# Patient Record
Sex: FEMALE | Race: WHITE | Employment: FULL TIME | URBAN - METROPOLITAN AREA
[De-identification: names, ages, dates, MRNs, and addresses within clinical notes are randomized per-mention and may not be internally consistent; named-entity substitution may affect disease eponyms.]

---

## 2019-01-25 ENCOUNTER — HOSPITAL ENCOUNTER (EMERGENCY)
Age: 24
Discharge: HOME | End: 2019-01-25
Attending: EMERGENCY MEDICINE
Payer: COMMERCIAL

## 2019-01-25 VITALS
DIASTOLIC BLOOD PRESSURE: 86 MMHG | HEART RATE: 114 BPM | RESPIRATION RATE: 14 BRPM | BODY MASS INDEX: 45.8 KG/M2 | WEIGHT: 285 LBS | TEMPERATURE: 98.2 F | OXYGEN SATURATION: 100 % | HEIGHT: 66 IN | SYSTOLIC BLOOD PRESSURE: 144 MMHG

## 2019-01-25 DIAGNOSIS — R03.0 ELEVATED BLOOD PRESSURE READING: ICD-10-CM

## 2019-01-25 DIAGNOSIS — B37.31 VAGINAL YEAST INFECTION: Primary | ICD-10-CM

## 2019-01-25 LAB
BACTERIA WET PREP: NORMAL
BILIRUBIN URINE: NEGATIVE
BLOOD, URINE: NEGATIVE
CLARITY: CLEAR
CLUE CELLS: NORMAL
COLOR: YELLOW
EPITHELIAL CELLS WET PREP: NORMAL
GLUCOSE URINE: NEGATIVE MG/DL
HCG(URINE) PREGNANCY TEST: NEGATIVE
KETONES, URINE: NEGATIVE MG/DL
LEUKOCYTE ESTERASE, URINE: NEGATIVE
MICROSCOPIC EXAMINATION: NORMAL
NITRITE, URINE: NEGATIVE
PH UA: 6
PROTEIN UA: NEGATIVE MG/DL
RBC WET PREP: NORMAL
SOURCE WET PREP: NORMAL
SPECIFIC GRAVITY UA: >=1.03
TRICHOMONAS PREP: NORMAL
URINE REFLEX TO CULTURE: NORMAL
URINE TYPE: NORMAL
UROBILINOGEN, URINE: 1 E.U./DL
WBC WET PREP: NORMAL
YEAST WET PREP: NORMAL

## 2019-01-25 PROCEDURE — 99283 EMERGENCY DEPT VISIT LOW MDM: CPT

## 2019-01-25 PROCEDURE — 87210 SMEAR WET MOUNT SALINE/INK: CPT

## 2019-01-25 PROCEDURE — 81003 URINALYSIS AUTO W/O SCOPE: CPT

## 2019-01-25 PROCEDURE — 84703 CHORIONIC GONADOTROPIN ASSAY: CPT

## 2019-01-25 PROCEDURE — 6370000000 HC RX 637 (ALT 250 FOR IP): Performed by: EMERGENCY MEDICINE

## 2019-01-25 RX ORDER — FLUCONAZOLE 100 MG/1
150 TABLET ORAL ONCE
Status: COMPLETED | OUTPATIENT
Start: 2019-01-25 | End: 2019-01-25

## 2019-01-25 RX ORDER — FLUCONAZOLE 150 MG/1
150 TABLET ORAL ONCE
Qty: 1 TABLET | Refills: 0 | Status: SHIPPED | OUTPATIENT
Start: 2019-01-28 | End: 2019-01-28

## 2019-01-25 RX ADMIN — FLUCONAZOLE 150 MG: 100 TABLET ORAL at 01:42

## 2020-10-08 ENCOUNTER — APPOINTMENT (OUTPATIENT)
Dept: GENERAL RADIOLOGY | Age: 25
End: 2020-10-08
Payer: COMMERCIAL

## 2020-10-08 ENCOUNTER — HOSPITAL ENCOUNTER (EMERGENCY)
Age: 25
Discharge: HOME | End: 2020-10-08
Payer: COMMERCIAL

## 2020-10-08 VITALS
HEART RATE: 75 BPM | OXYGEN SATURATION: 100 % | DIASTOLIC BLOOD PRESSURE: 85 MMHG | TEMPERATURE: 97.9 F | SYSTOLIC BLOOD PRESSURE: 129 MMHG | RESPIRATION RATE: 14 BRPM

## 2020-10-08 LAB
BILIRUBIN URINE: NEGATIVE
BLOOD, URINE: NEGATIVE
CLARITY: CLEAR
COLOR: YELLOW
GLUCOSE URINE: NEGATIVE MG/DL
HCG(URINE) PREGNANCY TEST: NEGATIVE
KETONES, URINE: NEGATIVE MG/DL
LEUKOCYTE ESTERASE, URINE: NEGATIVE
MICROSCOPIC EXAMINATION: NORMAL
NITRITE, URINE: NEGATIVE
PH UA: 6 (ref 5–8)
PROTEIN UA: NEGATIVE MG/DL
SPECIFIC GRAVITY UA: 1.02 (ref 1–1.03)
URINE REFLEX TO CULTURE: NORMAL
URINE TYPE: NORMAL
UROBILINOGEN, URINE: 1 E.U./DL

## 2020-10-08 PROCEDURE — 6360000002 HC RX W HCPCS: Performed by: PHYSICIAN ASSISTANT

## 2020-10-08 PROCEDURE — 84703 CHORIONIC GONADOTROPIN ASSAY: CPT

## 2020-10-08 PROCEDURE — 72100 X-RAY EXAM L-S SPINE 2/3 VWS: CPT

## 2020-10-08 PROCEDURE — 99283 EMERGENCY DEPT VISIT LOW MDM: CPT

## 2020-10-08 PROCEDURE — 96372 THER/PROPH/DIAG INJ SC/IM: CPT

## 2020-10-08 PROCEDURE — 6370000000 HC RX 637 (ALT 250 FOR IP): Performed by: PHYSICIAN ASSISTANT

## 2020-10-08 PROCEDURE — 81003 URINALYSIS AUTO W/O SCOPE: CPT

## 2020-10-08 RX ORDER — NAPROXEN 500 MG/1
500 TABLET ORAL 2 TIMES DAILY WITH MEALS
Qty: 20 TABLET | Refills: 0 | Status: SHIPPED | OUTPATIENT
Start: 2020-10-08 | End: 2020-10-18

## 2020-10-08 RX ORDER — ORPHENADRINE CITRATE 30 MG/ML
60 INJECTION INTRAMUSCULAR; INTRAVENOUS ONCE
Status: COMPLETED | OUTPATIENT
Start: 2020-10-08 | End: 2020-10-08

## 2020-10-08 RX ORDER — CYCLOBENZAPRINE HCL 10 MG
10 TABLET ORAL 3 TIMES DAILY PRN
Qty: 21 TABLET | Refills: 0 | Status: SHIPPED | OUTPATIENT
Start: 2020-10-08 | End: 2020-10-18

## 2020-10-08 RX ORDER — HYDROCODONE BITARTRATE AND ACETAMINOPHEN 5; 325 MG/1; MG/1
1 TABLET ORAL ONCE
Status: COMPLETED | OUTPATIENT
Start: 2020-10-08 | End: 2020-10-08

## 2020-10-08 RX ORDER — KETOROLAC TROMETHAMINE 30 MG/ML
30 INJECTION, SOLUTION INTRAMUSCULAR; INTRAVENOUS ONCE
Status: COMPLETED | OUTPATIENT
Start: 2020-10-08 | End: 2020-10-08

## 2020-10-08 RX ORDER — LIDOCAINE 50 MG/G
1 PATCH TOPICAL EVERY 24 HOURS
Qty: 30 PATCH | Refills: 0 | Status: SHIPPED | OUTPATIENT
Start: 2020-10-08 | End: 2020-11-07

## 2020-10-08 RX ADMIN — HYDROCODONE BITARTRATE AND ACETAMINOPHEN 1 TABLET: 5; 325 TABLET ORAL at 18:49

## 2020-10-08 RX ADMIN — KETOROLAC TROMETHAMINE 30 MG: 30 INJECTION, SOLUTION INTRAMUSCULAR; INTRAVENOUS at 18:49

## 2020-10-08 RX ADMIN — ORPHENADRINE CITRATE 60 MG: 30 INJECTION INTRAMUSCULAR; INTRAVENOUS at 18:49

## 2020-10-08 ASSESSMENT — PAIN SCALES - GENERAL
PAINLEVEL_OUTOF10: 3
PAINLEVEL_OUTOF10: 8
PAINLEVEL_OUTOF10: 8

## 2020-10-08 ASSESSMENT — PAIN DESCRIPTION - LOCATION: LOCATION: BACK

## 2020-10-08 ASSESSMENT — PAIN DESCRIPTION - PAIN TYPE: TYPE: ACUTE PAIN

## 2020-10-08 NOTE — ED PROVIDER NOTES
[address] ED  EMERGENCY DEPARTMENT ENCOUNTER        Pt Name: John Paul Rhoades  MRN: [id number]  [place] 1995  Date of evaluation: 10/8/2020  Provider: Andrew Gonzalez PA-C  PCP: No primary care provider on file. Shared Visit or Autonomous Visit: LUCERO. I have evaluated this patient. My supervising physician was available for consultation. CHIEF COMPLAINT       Chief Complaint   Patient presents with    Back Pain       HISTORY OF PRESENT ILLNESS   (Location/Symptom, Timing/Onset, Context/Setting, Quality, Duration, Modifying Factors, Severity)  Note limiting factors. John Paul Rhoades is a 21 y.o. female presenting to the emergency department with complaint of lower back pain on the left side that has bothered her for at least the past 4 months. 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 No falls or direct trauma. Pain was coming and going and now is more constant and worsening over the past 1 month. She saw her family doctor 1 month ago and was prescribed prednisone taper and Robaxin states no relief feels she is worsening. Pain is worse with movement position change when she lays on her left side and is relieved when she changes positions. No radiation of pain into extremities. No bowel or bladder dysfunction. No saddle anesthesia. No numbness or weakness of extremities. No dysuria hematuria. No abdominal pain. No vomiting or fevers. States she gets pain quite often in her lower back that she figures is from her job but this pain is worse and constant now. No chest pain no shortness of breath. No cough or fevers. The history is provided by the patient. Back Pain   Location:  Lumbar spine  Quality:  Aching  Radiates to:  Does not radiate  Onset quality:  Gradual  Duration:  1 month  Progression:  Worsening  Chronicity:  New  Context: not falling    Worsened by:   Movement and palpation  Associated symptoms: no abdominal pain, no abdominal swelling, no bladder incontinence, no bowel incontinence, no chest pain, no dysuria, no fever, no leg pain, no numbness, no pelvic pain, no perianal numbness and no weakness            Nursing Notes were reviewed    REVIEW OF SYSTEMS    (2-9 systems for level 4, 10 or more for level 5)     Review of Systems   Constitutional: Negative for chills and fever. Respiratory: Negative for shortness of breath. Cardiovascular: Negative for chest pain. Gastrointestinal: Negative for abdominal pain, bowel incontinence and vomiting. Genitourinary: Negative for bladder incontinence, difficulty urinating, dysuria and pelvic pain. Musculoskeletal: Positive for back pain. Skin: Negative for rash. Neurological: Negative for weakness and numbness. All other systems reviewed and are negative. Positives and Pertinent negatives as per HPI. PAST MEDICAL HISTORY     Past Medical History:   Diagnosis Date    GDM (gestational diabetes mellitus) 5/11/2016    GDM (gestational diabetes mellitus) 5/11/2016         SURGICAL HISTORY     Past Surgical History:   Procedure Laterality Date    TONSILLECTOMY           CURRENTMEDICATIONS       Discharge Medication List as of 10/8/2020  7:37 PM            ALLERGIES     Patient has no known allergies.     FAMILYHISTORY       Family History   Problem Relation Age of Onset    Hypertension Mother     Asthma Mother     Diabetes Maternal Grandmother     Hypertension Maternal Grandmother     High Cholesterol Maternal Grandmother     Thyroid Disease Maternal Grandmother     Cancer Maternal Grandfather     Diabetes Paternal Grandmother     Cancer Paternal Grandfather           SOCIAL HISTORY       Social History     Socioeconomic History    Marital status:      Spouse name: None    Number of children: None    Years of education: None    Highest education level: None   Occupational History    None   Social Needs    Financial resource strain: None    Food insecurity     Worry: None     Inability: None    Transportation needs     Medical: None     Non-medical: None   Tobacco Use    Smoking status: Never Smoker    Smokeless tobacco: Never Used   Substance and Sexual Activity    Alcohol use: Yes     Alcohol/week: 0.0 standard drinks     Comment: OCCAS    Drug use: No     Types: Marijuana     Comment: occasionally    Sexual activity: Yes     Partners: Male   Lifestyle    Physical activity     Days per week: None     Minutes per session: None    Stress: None   Relationships    Social connections     Talks on phone: None     Gets together: None     Attends [employer] service: None     Active member of club or organization: None     Attends meetings of clubs or organizations: None     Relationship status: None    Intimate partner violence     Fear of current or ex partner: None     Emotionally abused: None     Physically abused: None     Forced sexual activity: None   Other Topics Concern    None   Social History Narrative    None       SCREENINGS    [place] Coma Scale  Eye Opening: Spontaneous  Best Verbal Response: Oriented  Best Motor Response: Obeys commands  [place] Coma Scale Score: 15        PHYSICAL EXAM    (up to 7 for level 4, 8 or more for level 5)     ED Triage Vitals [10/08/20 1818]   BP Temp Temp src Pulse Resp SpO2 Height Weight   (!) 155/90 97.9 °F (36.6 °C) -- 94 20 98 % -- --       Physical Exam  Vitals signs and nursing note reviewed. Constitutional:       Appearance: She is well-developed. She is not ill-appearing or toxic-appearing. HENT:      Head: Normocephalic and atraumatic. Mouth/Throat:      Mouth: Mucous membranes are moist.      Pharynx: Oropharynx is clear. No pharyngeal swelling or posterior oropharyngeal erythema. Eyes:      Conjunctiva/sclera: Conjunctivae normal.   Cardiovascular:      Rate and Rhythm: Normal rate and regular rhythm. Pulses: Normal pulses. Posterior tibial pulses are 2+ on the right side and 2+ on the left side. Heart sounds: Normal heart sounds. Pulmonary:      Effort: Pulmonary effort is normal. No respiratory distress. Breath sounds: Normal breath sounds. No stridor. No wheezing, rhonchi or rales. Abdominal:      General: Bowel sounds are normal.      Palpations: Abdomen is soft. Tenderness: There is no abdominal tenderness. There is no guarding or rebound. Musculoskeletal:      Lumbar back: She exhibits tenderness and pain. She exhibits normal pulse. Back:    Skin:     General: Skin is warm and dry. Neurological:      Mental Status: She is alert and oriented to person, place, and time. Sensory: Sensation is intact. Motor: Motor function is intact. No weakness or abnormal muscle tone. Gait: Gait is intact. Deep Tendon Reflexes:      Reflex Scores:       Patellar reflexes are 2+ on the right side and 2+ on the left side. Achilles reflexes are 2+ on the right side and 2+ on the left side. Comments: Intact sensation. Equal strength 5 out of 5 symmetric lower extremities. Patient flexes and extends legs against resistance. Good dorsi and plantarflexion of feet against resistance. Reflexes intact and symmetric patellar and Achilles 2+. Patient ambulates in the room with normal gait no foot drop.    Psychiatric:         Behavior: Behavior normal.         DIAGNOSTIC RESULTS   LABS:    Labs Reviewed   URINE RT REFLEX TO CULTURE    Narrative:     Performed at:  [facility] [address],  [place], [facility]   Phone [phone]   PREGNANCY, URINE    Narrative:     Performed at:  [facility]) - [facility] [address],  [place], [facility]   Phone [phone]     Results for orders placed or performed during the hospital encounter of 10/08/20   Urinalysis Reflex to Culture    Specimen: Urine, clean catch   Result Value Ref Range PROCEDURES   Unless otherwise noted below, none     Procedures    CRITICAL CARE TIME   N/A    CONSULTS:  None      EMERGENCY DEPARTMENT COURSE and DIFFERENTIAL DIAGNOSIS/MDM:   Vitals:    Vitals:    10/08/20 1818 10/08/20 1940   BP: (!) 155/90 129/85   Pulse: 94 75   Resp: 20 14   Temp: 97.9 °F (36.6 °C)    SpO2: 98% 100%       Patient was given thefollowing medications:  Medications   ketorolac (TORADOL) injection 30 mg (30 mg Intramuscular Given 10/8/20 1849)   orphenadrine (NORFLEX) injection 60 mg (60 mg Intramuscular Given 10/8/20 1849)   HYDROcodone-acetaminophen (NORCO) 5-325 MG per tablet 1 tablet (1 tablet Oral Given 10/8/20 1849)       7:26 PM EDT  Recheck patient. Much improved after medications given here. Discussed test results with her. X-ray negative for any fracture or misalignment. She has degenerative change at T12-L1. She is provided referrals for follow-up prescriptions given advise returning for any worsening she understands and agrees. I estimate there is LOW risk for ABDOMINAL AORTIC ANEURYSM, CAUDA EQUINA SYNDROME, EPIDURAL MASS LESION, OR CORD COMPRESSION, thus I consider the discharge disposition reasonable. FINAL IMPRESSION      1. Acute left-sided low back pain without sciatica    2.  Elevated blood pressure reading          DISPOSITION/PLAN   DISPOSITION Decision to Discharge    PATIENT REFERREDTO:  [facility]) Pre-Services  [phone]  Schedule an appointment as soon as possible for a visit       [facility] ED  [address]  [phone]    If symptoms worsen    Syed Lopez MD  [place] [id number] [facility] [address]  [phone]    Schedule an appointment as soon as possible for a visit   [place] [facility]      DISCHARGE MEDICATIONS:  Discharge Medication List as of 10/8/2020  7:37 PM      START taking these medications    Details   lidocaine (LIDODERM) 5 % Place 1 patch onto the skin every 24 hours 12 hours on, 12 hours off., Disp-30 patch,R-0Print      cyclobenzaprine (FLEXERIL) 10 MG tablet Take 1 tablet by mouth 3 times daily as needed for Muscle spasms, Disp-21 tablet,R-0Print      naproxen (NAPROSYN) 500 MG tablet Take 1 tablet by mouth 2 times daily (with meals) for 10 days, Disp-20 tablet,R-0Print             DISCONTINUED MEDICATIONS:  Discharge Medication List as of 10/8/2020  7:37 PM      STOP taking these medications       Phentermine HCl (ADIPEX-P PO) Comments:   Reason for Stopping:                      (Please note that portions ofthis note were completed with a voice recognition program.  Efforts were made to edit the dictations but occasionally words are mis-transcribed.)    Kanika Huff PA-C (electronically signed)            Poornima Franco PA-C  10/09/20 [id number]

## 2020-10-08 NOTE — LETTER
[facility]  [address] [address]  Phone: [phone]               October 8, 2020    Patient: Evi Maguire   YOB: 1995   Date of Visit: 10/8/2020       To Whom It May Concern:    Negro Presume was seen and treated in our emergency department on 10/8/2020. Please excuse from work 10/9/2020.     Sincerely,       Xin Dillon PA-C         Signature:__________________________________

## 2020-10-09 ASSESSMENT — ENCOUNTER SYMPTOMS
BOWEL INCONTINENCE: 0
ABDOMINAL PAIN: 0
BACK PAIN: 1
SHORTNESS OF BREATH: 0
ABDOMINAL SWELLING: 0
VOMITING: 0

## 2020-10-14 ENCOUNTER — OFFICE VISIT (OUTPATIENT)
Dept: ORTHOPEDIC SURGERY | Age: 25
End: 2020-10-14
Payer: COMMERCIAL

## 2020-10-14 VITALS — WEIGHT: 293 LBS | BODY MASS INDEX: 47.09 KG/M2 | HEIGHT: 66 IN

## 2020-10-14 PROCEDURE — 99203 OFFICE O/P NEW LOW 30 MIN: CPT | Performed by: FAMILY MEDICINE

## 2020-10-14 PROCEDURE — 1036F TOBACCO NON-USER: CPT | Performed by: FAMILY MEDICINE

## 2020-10-14 PROCEDURE — G8484 FLU IMMUNIZE NO ADMIN: HCPCS | Performed by: FAMILY MEDICINE

## 2020-10-14 PROCEDURE — L0625 LO FLEX L1-BELOW L5 PRE OTS: HCPCS | Performed by: FAMILY MEDICINE

## 2020-10-14 PROCEDURE — G8417 CALC BMI ABV UP PARAM F/U: HCPCS | Performed by: FAMILY MEDICINE

## 2020-10-14 PROCEDURE — G8427 DOCREV CUR MEDS BY ELIG CLIN: HCPCS | Performed by: FAMILY MEDICINE

## 2020-10-14 RX ORDER — DICLOFENAC SODIUM 75 MG/1
75 TABLET, DELAYED RELEASE ORAL 2 TIMES DAILY
Qty: 60 TABLET | Refills: 3 | Status: SHIPPED | OUTPATIENT
Start: 2020-10-14

## 2020-10-14 NOTE — PROGRESS NOTES
tenderness, deformity or injury. Range of motion is unremarkable. There is no gross instability. There are no rashes, ulcerations or lesions. Strength and tone are normal.      Diagnostic Test Findings:    AP and lateral lumbar spine films obtained at [facility] 10/8/2020 as listed above  Narrative    EXAMINATION:    THREE XRAY VIEWS OF THE LUMBAR SPINE         10/8/2020 6:48 pm         COMPARISON:    None.         HISTORY:    ORDERING SYSTEM PROVIDED HISTORY: pain    TECHNOLOGIST PROVIDED HISTORY:    Reason for exam:->pain    Reason for Exam: back pain    Acuity: Unknown    Type of Exam: Unknown         FINDINGS:    Bone density is preserved with no acute fracture.  Vertebral alignment is    maintained.  Disc space height is normal.  Minimal osteoarthritic spurring at    T12-L1 with no other significant arthritic changes.  The facet joints are    unremarkable.  The SI joints are intact.              Impression    No acute osseous abnormality of the lumbar spine.                  Assessment: #1.  3 to 4-month status post unrehabilitated thoracolumbar strain/myofascial pain with lower back pain. Impression:  Encounter Diagnoses   Name Primary?  Thoracic myofascial strain, initial encounter Yes    Strain of lumbar region, initial encounter     Acute left-sided low back pain without sciatica        Office Procedures:  Orders Placed This Encounter   Procedures    Ambulatory referral to Physical Therapy     Referral Priority:   Routine     Referral Type:   Eval and Treat     Referral Reason:   Specialty Services Required     Requested Specialty:   Physical Therapy     Number of Visits Requested:   1    Bird and Syed Extensor Lumbosacral Support Brace (Warm and Form)     Patient was prescribed a Bird and Syed Extensor Lumbosacral Support Brace with a pocket.   This orthosis is required for the following reasons:    Reduce pain by restricting mobility of the trunk  Facilitate healing following an injury to the spine or related soft tissues  Support weak spinal muscles    The patient was educated and fit by a healthcare professional with expert knowledge and specialization in brace application while under the direct supervision of the treating physician. Verbal and written instructions for the use of and application of this item were provided. They were instructed to contact the office immediately should the brace result in increased pain, decreased sensation, increased swelling or worsening of the condition. Treatment Plan:  Treatment options were discussed with Evi Maguire. We did review her plain films and exam findings. We did place her in a warm-and-form type belt and start her in aggressive physical therapy for her suspected left thoracolumbar myofascial pain. We also started her on diclofenac 75 mg 1 pill twice daily and may continue with the muscle relaxer given to her by the emergency room potentially at night. I think she can work in her belt. We will see her back in about 4 weeks and consider imaging if she is failing to improve. She will contact us in the interim with questions or concerns. This dictation was performed with a verbal recognition program (DRAGON) and it was checked for errors. It is possible that there are still dictated errors within this office note. If so, please bring any errors to my attention for an addendum. All efforts were made to ensure that this office note is accurate.

## 2020-10-29 ENCOUNTER — HOSPITAL ENCOUNTER (OUTPATIENT)
Dept: PHYSICAL THERAPY | Age: 25
Setting detail: THERAPIES SERIES
Discharge: HOME | End: 2020-10-29
Payer: COMMERCIAL

## 2020-10-29 PROCEDURE — 97161 PT EVAL LOW COMPLEX 20 MIN: CPT

## 2020-10-29 PROCEDURE — 97110 THERAPEUTIC EXERCISES: CPT

## 2020-10-29 NOTE — PLAN OF CARE
[facility]  [address] 44, [place] [address], [address] [facility] [address]  Phone: [phone]   Fax:     [phone]     Physical Therapy Certification    Dear Referring Practitioner: Dr. Erendira Pastor,    We had the pleasure of evaluating the following patient for physical therapy services at [address] [place] [address]. A summary of our findings can be found in the initial assessment below. This includes our plan of care. If you have any questions or concerns regarding these findings, please do not hesitate to contact me at the office phone number checked above. Thank you for the referral.       Physician Signature:_______________________________Date:__________________  By signing above (or electronic signature), therapists plan is approved by physician      Patient: Aaron Charlton   : 1995   MRN: [id number]  Referring Physician: Referring Practitioner: Dr. Erendira Pastor      Evaluation Date: 10/29/2020      Medical Diagnosis Information:  Diagnosis: Thoracic myofascial strain S29.019A  Lumbar strain S39.012A Acute left sided LBP M54.5   Treatment Diagnosis: Acute left sided LBP M54.5                                         Insurance information: PT Insurance Information: [employer]     Precautions/ Contra-indications: none noted      C-SSRS Triggered by Intake questionnaire (Past 2 wk assessment):   [x] No, Questionnaire did not trigger screening.   [] Yes, Patient intake triggered further evaluation      [] C-SSRS Screening completed  [] PCP notified via Plan of Care  [] Emergency services notified     Latex Allergy:  [x]NO      []YES  Preferred Language for Healthcare:   [x]English       []other:    SUBJECTIVE: Patient stated complaint: Reports over a year insidious onset of LBP. 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 Denies specific incident or injury.  Transferring patientsincreases symptoms. Had XR, neg for acute injury. 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 OBJECTIVE:     ROM  Comments   Lumbar Flex To fee    Lumbar Ext       ROM LEFT RIGHT Comments   Lumbar Side Bend To knee To knee p!     Lumbar Rotation 100% 100%    Hip Flexion      Hip Abd      Hip ER      Hip IR      Hip Extension      Knee Ext      Knee Flex      Hamstring Flex      Piriformis                    Strength LEFT RIGHT Comments   Multifidus      Transverse Ab      Hip Flexors      Hip Abductors      Hip Extensors                     Myotomes Normal Abnormal Comments   Hip flexion (L1-L2) X     Knee extension (L2-L4) X     Dorsiflexion (L4-L5) X     Great Toe Ext (L5) X     Ankle Eversion (S1-S2) X     Ankle PF(S1-S2) X       Dermatomes Normal Abnormal Comments   inguinal area (L1)  X     anterior mid-thigh (L2) X     distal ant thigh/med knee (L3) X     medial lower leg and foot (L4) X     lateral lower leg and foot (L5) X     posterior calf (S1) X     medial calcaneus (S2) X       Neural dynamic tension testing Normal Abnormal Comments   Slump Test  - Degree of knee flexion:       SLR  X     0-30      30-70      Femoral nerve (L2-4)        Reflexes Normal Abnormal Comments   S1-2 Seated achilles      S1-2 Prone knee bend      L3-4 Patellar tendon      C5-6 Biceps      C6 Brachioradialis      C7-8 Triceps      Clonus X     Babinski      Luna's X       Joint mobility:     []Normal    []Hypo   []Hyper    Palpation: Lumbar CPA's mod-max TTP    Functional Mobility/Transfers: I with transfers    Posture: WFL    Gait: (include devices/WB status) I no AD    Bandages/Dressings/Incisions: NA     Orthopedic Special Tests:    Normal Abnormal N/A Comments   Toe walk   X      Heel Walk X      Fwd Bend-aberrant or innominate mvmt) X      Trendelenburg X      Kemps/Quadrant       Ethelk       NEREIDA/Tucker X      Hip scour       SLR X      Crossed SLR       Supine to sit       Hip thrust       SI distraction/compression       PA/Spring       Prone Instability test       Prone knee bend       Sacral Spring/thrust                  [x] Patient history, allergies, meds reviewed. Medical chart reviewed. See intake form. Review Of Systems (ROS):  [x]Performed Review of systems (Integumentary, CardioPulmonary, Neurological) by intake and observation. Intake form has been scanned into medical record. Patient has been instructed to contact their primary care physician regarding ROS issues if not already being addressed at this time.       Co-morbidities/Complexities (which will affect course of rehabilitation):   [x]None           Arthritic conditions   []Rheumatoid arthritis (M05.9)  []Osteoarthritis (M19.91)   Cardiovascular conditions   []Hypertension (I10)  []Hyperlipidemia (E78.5)  []Angina pectoris (I20)  []Atherosclerosis (I70)   Musculoskeletal conditions   []Disc pathology   []Congenital spine pathologies   []Prior surgical intervention  []Osteoporosis (M81.8)  []Osteopenia (M85.8)   Endocrine conditions   []Hypothyroid (E03.9)  []Hyperthyroid Gastrointestinal conditions   []Constipation (H13.27)   Metabolic conditions   []Morbid obesity (E66.01)  []Diabetes type 1(E10.65) or 2 (E11.65)   []Neuropathy (G60.9)     Pulmonary conditions   []Asthma (J45)  []Coughing   []COPD (J44.9)   Psychological Disorders  []Anxiety (F41.9)  []Depression (F32.9)   []Other:   []Other:           Barriers to/and or personal factors that will affect rehab potential:              []Age  []Sex              []Motivation/Lack social activities. []Reduced participation in sport/recreational activities. Classification:   [x]Signs/symptoms consistent with Lumbar instability/stabilization subgroup. []Signs/symptoms consistent with Lumbar mobilization/manipulation subgroup, myotomes and dermatomes intact. Meets manipulation criteria. []Signs/symptoms consistent with Lumbar direction specific/centralization subgroup   []Signs/symptoms consistent with Lumbar traction subgroup     []Signs/symptoms consistent with lumbar facet dysfunction   []Signs/symptoms consistent with lumbar stenosis type dysfunction   []Signs/symptoms consistent with nerve root involvement including myotome & dermatome dysfunction   []Signs/symptoms consistent with post-surgical status including: decreased ROM, strength and function.    []signs/symptoms consistent with pathology which may benefit from Dry needling     []other:      Prognosis/Rehab Potential:      []Excellent   [x]Good    []Fair   []Poor    Tolerance of evaluation/treatment:    []Excellent   [x]Good    []Fair   []Poor     Physical Therapy Evaluation Complexity Justification  [x] A history of present problem with:  [x] no personal factors and/or comorbidities that impact the plan of care;  []1-2 personal factors and/or comorbidities that impact the plan of care  []3 personal factors and/or comorbidities that impact the plan of care  [x] An examination of body systems using standardized tests and measures addressing any of the following: body structures and functions (impairments), activity limitations, and/or participation restrictions;:  [] a total of 1-2 or more elements   [] a total of 3 or more elements   [x] a total of 4 or more elements   [x] A clinical presentation with:  [x] stable and/or uncomplicated characteristics   [] evolving clinical presentation with changing characteristics  [] unstable and unpredictable characteristics;   [x] Clinical decision making of [x] low, [] moderate, [] high complexity using standardized patient assessment instrument and/or measurable assessment of functional outcome. [x] EVAL (LOW) 84927 (typically 20 minutes face-to-face)  [] EVAL (MOD) 69302 (typically 30 minutes face-to-face)  [] EVAL (HIGH) 06728 (typically 45 minutes face-to-face)  [] RE-EVAL     PLAN: Begin PT focusing on: proximal hip mobilizations, LB mobs, LB core activation, proximal hip activation, and HEP    Frequency/Duration:  1-2 days per week for 6 Weeks:  Interventions:  [x]  Therapeutic exercise including: strength training, ROM, for LE, Glutes and core   [x]  NMR activation and proprioception for glutes , LE and Core   [x]  Manual therapy as indicated for Hip complex, LE and spine to include: Dry Needling/IASTM, STM, PROM, Gr I-IV mobilizations, manipulation. [x]  Modalities as needed that may include: thermal agents, E-stim, Biofeedback, US, iontophoresis as indicated  [x]  Patient education on joint protection, postural re-education, activity modification, progression of HEP. HEP instruction:  (see scanned forms)    GOALS:  Patient stated goal: Strengthen back. Therapist goals for Patient:   Short Term Goals: To be achieved in: 2 weeks  1. Independent in HEP and progression per patient tolerance, in order to prevent re-injury. [] Progressing: [] Met: [] Not Met: [] Adjusted  2. Patient will have a decrease in pain to facilitate improvement in movement, function, and ADLs as indicated by Functional Deficits. [] Progressing: [] Met: [] Not Met: [] Adjusted    Long Term Goals: To be achieved in: 6 weeks  1. Disability index score of 6% or less for the CHER to assist with reaching prior level of function. [] Progressing: [] Met: [] Not Met: [] Adjusted  2. Patient will demonstrate increased AROM to WNL, good LS mobility, good hip ROM to allow for proper joint functioning as indicated by patients Functional Deficits. [] Progressing: [] Met: [] Not Met: [] Adjusted  3.  Patient will demonstrate an increase in Strength to good proximal hip and core activation to allow for proper functional mobility as indicated by patients Functional Deficits. [] Progressing: [] Met: [] Not Met: [] Adjusted  4. Patient will return to functional activities without increased symptoms or restriction.    [] Progressing: [] Met: [] Not Met: [] Adjusted      Electronically signed by:  Arabella Peters PT

## 2020-10-29 NOTE — FLOWSHEET NOTE
Intervention (47293)                                                 NMR re-education (77485)   CUES NEEDED                                                                   Therapeutic Activity (19492)                                                     Therapeutic Exercise and NMR EXR  [x] (53390) Provided verbal/tactile cueing for activities related to strengthening, flexibility, endurance, ROM  for improvements in proximal hip and core control with self care, mobility, lifting and ambulation.  [] (27691) Provided verbal/tactile cueing for activities related to improving balance, coordination, kinesthetic sense, posture, motor skill, proprioception  to assist with core control in self care, mobility, lifting, and ambulation.      Therapeutic Activities:    [] (67996 or 39025) Provided verbal/tactile cueing for activities related to improving balance, coordination, kinesthetic sense, posture, motor skill, proprioception and motor activation to allow for proper function  with self care and ADLs  [] (84448) Provided training and instruction to the patient for proper core and proximal hip recruitment and positioning with ambulation re-education     Home Exercise Program:    [x] (67891) Reviewed/Progressed HEP activities related to strengthening, flexibility, endurance, ROM of core, proximal hip and LE for functional self-care, mobility, lifting and ambulation   [] (20882) Reviewed/Progressed HEP activities related to improving balance, coordination, kinesthetic sense, posture, motor skill, proprioception of core, proximal hip and LE for self care, mobility, lifting, and ambulation      Manual Treatments:  PROM / STM / Oscillations-Mobs:  G-I, II, III, IV (PA's, Inf., Post.)  [] (04254) Provided manual therapy to mobilize proximal hip and LS spine soft tissue/joints for the purpose of modulating pain, promoting relaxation,  increasing ROM, reducing/eliminating soft tissue swelling/inflammation/restriction, improving soft tissue extensibility and allowing for proper ROM for normal function with self care, mobility, lifting and ambulation. Modalities:     [] GAME READY (VASO)- for significant edema, swelling, pain control. Charges:  Timed Code Treatment Minutes: 25   Total Treatment Minutes: 38      [x] EVAL (LOW) 68140 (typically 20 minutes face-to-face)  [] EVAL (MOD) 05391 (typically 30 minutes face-to-face)  [] EVAL (HIGH) 64307 (typically 45 minutes face-to-face)  [] RE-EVAL     [x] PT(86791) x  2   [] IONTO  [] NMR (93005) x     [] VASO  [] Manual (82061) x     [] Other:  [] TA x      [] Mech Traction (66331)  [] ES(attended) (92132)      [] ES (un) (06893):       ASSESSMENT:  See eval      GOALS:    Patient stated goal: Strengthen back. Therapist goals for Patient:   Short Term Goals: To be achieved in: 2 weeks  1. Independent in HEP and progression per patient tolerance, in order to prevent re-injury. [] Progressing: [] Met: [] Not Met: [] Adjusted  2. Patient will have a decrease in pain to facilitate improvement in movement, function, and ADLs as indicated by Functional Deficits. [] Progressing: [] Met: [] Not Met: [] Adjusted    Long Term Goals: To be achieved in: 6 weeks  1. Disability index score of 6% or less for the CHER to assist with reaching prior level of function. [] Progressing: [] Met: [] Not Met: [] Adjusted  2. Patient will demonstrate increased AROM to WNL, good LS mobility, good hip ROM to allow for proper joint functioning as indicated by patients Functional Deficits. [] Progressing: [] Met: [] Not Met: [] Adjusted  3. Patient will demonstrate an increase in Strength to good proximal hip and core activation to allow for proper functional mobility as indicated by patients Functional Deficits. [] Progressing: [] Met: [] Not Met: [] Adjusted  4. Patient will return to functional activities without increased symptoms or restriction.    [] Progressing: [] Met: [] Not Met: [] Adjusted        Overall Progression Towards Functional goals/ Treatment Progress Update:  [] Patient is progressing as expected towards functional goals listed. [] Progression is slowed due to complexities/Impairments listed. [] Progression has been slowed due to co-morbidities. [x] Plan just implemented, too soon to assess goals progression <30days   [] Goals require adjustment due to lack of progress  [] Patient is not progressing as expected and requires additional follow up with physician  [] Other    Prognosis for POC: [x] Good [] Fair  [] Poor      Patient requires continued skilled intervention: [x] Yes  [] No    Treatment/Activity Tolerance:  [x] Patient able to complete treatment  [] Patient limited by fatigue  [] Patient limited by pain    [] Patient limited by other medical complications  [] Other:     Return to Play: (if applicable)   []  Stage 1: Intro to Strength   []  Stage 2: Return to Run and Strength   []  Stage 3: Return to Jump and Strength   []  Stage 4: Dynamic Strength and Agility   []  Stage 5: Sport Specific Training     []  Ready to Return to Play, Meets All Above Stages   []  Not Ready for Return to Sports   Comments:                           PLAN: See eval  [] Continue per plan of care [] Alter current plan (see comments above)  [x] Plan of care initiated [] Hold pending MD visit [] Discharge    Electronically signed by:  Nataly Lafleur PT    Note: If patient does not return for scheduled/ recommended follow up visits, this note will serve as a discharge from care along with most recent update on progress.

## 2020-11-03 ENCOUNTER — HOSPITAL ENCOUNTER (OUTPATIENT)
Dept: PHYSICAL THERAPY | Age: 25
Setting detail: THERAPIES SERIES
Discharge: HOME | End: 2020-11-03
Payer: COMMERCIAL

## 2020-11-03 NOTE — FLOWSHEET NOTE
[address] [facility], [address] [place] [address], [address] [place] [address] [address]  Phone: [phone]   Fax:     [phone]    Physical Therapy  Cancellation/No-show Note  Patient Name:  Audrey Mckeon  :  1995   Date:  11/3/2020    Cancelled visits to date: 0  No-shows to date: 1    For today's appointment patient:  []  Cancelled  []  Rescheduled appointment  [x]  No-show     Reason given by patient:  []  Patient ill  []  Conflicting appointment  []  No transportation    []  Conflict with work  [x]  No reason given  []  Other:     Comments:      Phone call information:   []  Phone call made today to patient at _ time at number provided:      []  Patient answered, conversation as follows:    []  Patient did not answer, message left as follows:  [x]  Phone call not made today  []  Phone call not needed - pt contacted us to cancel and provided reason for cancellation.      Electronically signed by:  Asha Carey PT